# Patient Record
Sex: MALE | Race: OTHER | HISPANIC OR LATINO | URBAN - METROPOLITAN AREA
[De-identification: names, ages, dates, MRNs, and addresses within clinical notes are randomized per-mention and may not be internally consistent; named-entity substitution may affect disease eponyms.]

---

## 2020-12-14 ENCOUNTER — EMERGENCY (EMERGENCY)
Facility: HOSPITAL | Age: 50
LOS: 1 days | Discharge: HOME | End: 2020-12-14
Attending: EMERGENCY MEDICINE | Admitting: EMERGENCY MEDICINE
Payer: MEDICAID

## 2020-12-14 VITALS
WEIGHT: 203.05 LBS | OXYGEN SATURATION: 98 % | HEIGHT: 64 IN | TEMPERATURE: 98 F | DIASTOLIC BLOOD PRESSURE: 90 MMHG | SYSTOLIC BLOOD PRESSURE: 151 MMHG | HEART RATE: 92 BPM | RESPIRATION RATE: 15 BRPM

## 2020-12-14 DIAGNOSIS — Z20.828 CONTACT WITH AND (SUSPECTED) EXPOSURE TO OTHER VIRAL COMMUNICABLE DISEASES: ICD-10-CM

## 2020-12-14 PROCEDURE — U0003: CPT

## 2020-12-14 PROCEDURE — 99283 EMERGENCY DEPT VISIT LOW MDM: CPT

## 2020-12-14 NOTE — ED PROVIDER NOTE - NSFOLLOWUPINSTRUCTIONS_ED_ALL_ED_FT
COVID-19 (Enfermedad por coronavirus 2019)    LO QUE NECESITA SABER:    COVID-19 es la enfermedad causada por el nuevo coronavirus del 2019. Se encontró por primera vez en individuos en reyna parte de [place] a finales de 2019. El virus se está propagando a otros países a medida que las personas infectadas viajan. Los coronavirus generalmente causan infecciones respiratorias (nariz, garganta y pulmones), deanna un resfriado. También pueden causar infecciones graves, deanna el síndrome respiratorio del [place] (MERS) y el síndrome respiratorio blu severo (SARS). El nuevo virus está relacionado con el coronavirus del SARS, por lo que oficialmente se denomina coronavirus del SARS 2 (SARS-CoV-2).    INSTRUCCIONES SOBRE EL [facility]:    Si raza que usted o alguien que conoce puede estar infectado:Es importante que cualquier persona que pueda estar infectada se georgie la prueba de inmediato. Georgie lo siguiente para proteger a otras personas:     Si se requiere atención de emergencia,avise al operador de la posible infección, o llame antes y avise al servicio de urgencias.      Llame a un médicopara verlo en el consultorio. Cualquier persona que pueda estar infectada no debe llegar sin llamar london. El médico deberá proteger a los miembros del personal y a otros pacientes.      La persona que puede estar infectada debe usar reyna mascarilla médicaantes de recibir atención médica. La mascarilla debe permanecer puesta hasta que el médico le indique que se la quite.    Llame al número local de emergencias (911 en los [place]) o pídale a alguien que lo lleve al departamento de emergencias si:Tiene signos o síntomas de COVID-19, y cualquiera de los siguientes es cierto:     Viajó en los últimos 14 días a reyna nabil donde el virus está activo o tuvo un contacto cercano con alguien que lo hizo.      Usted tuvo contacto cercano en los últimos 14 días con alguien que tiene reyna infección confirmada.    Llame a escobedo médico si:No tiene signos o síntomas de COVID-19, yodit cualquiera de los siguientes es cierto:     Viajó en los últimos 14 días a reyna nabil donde el virus está activo o tuvo un contacto cercano con alguien que lo hizo.      Usted tuvo contacto cercano en los últimos 14 días con alguien que tiene reyna infección confirmada.      Usted tiene preguntas o inquietudes acerca de escobedo condición o cuidado.    Medicamentos:Es posible que necesite alguno de los siguientes si los síntomas son leves:     Los descongestionantesayudan a reducir la congestión nasal y ayudan a respirar más fácilmente. Si stanton pastillas descongestionantes, pueden causarle agitación o problemas para dormir. No use aerosol descongestionante por más de unos cuantos días.      Los jarabes para la tosayudan a reducir la tos. Pregúntele a escobedo médico cuál tipo de medicamento para la tos es mejor para usted.      Acetaminofénalivia el dolor y baja la fiebre. Está disponible sin receta médica. Pregunte la cantidad y la frecuencia con que debe tomarlos. Siga las indicaciones. Munira las etiquetas de todos los demás medicamentos que esté usando para saber si también contienen acetaminofén, o pregunte a escobedo médico o farmacéutico. El acetaminofén puede causar daño en el hígado cuando no se stanton de forma correcta. No use más de 4 gramos (4000 miligramos) en total de acetaminofeno en un día.      Los DYLLAN,deanna el ibuprofeno, ayudan a disminuir la inflamación, el dolor y la fiebre. Los DYLLAN pueden causar sangrado estomacal o problemas renales en ciertas personas. Si usted stanton un medicamento anticoagulante, siempre pregúntele a escobedo médico si los DYLLAN son seguros para usted. Siempre munira la etiqueta de jimbo medicamento y siga las instrucciones.      [place] justo medicamentos deanna se le haya indicado.Consulte con escobedo médico si usted raza que escobedo medicamento no le está ayudando o si presenta efectos secundarios. Infórmele si es alérgico a cualquier medicamento. Mantenga reyna lista actualizada de los medicamentos, las vitaminas y los productos herbales que stanton. Incluya los siguientes datos de los medicamentos: cantidad, frecuencia y motivo de administración. Traiga con usted la lista o los envases de las píldoras a justo citas de seguimiento. Lleve la lista de los medicamentos con usted en jayme de reyna emergencia.    Cómo se propaga el coronavirus 2019:El virus parece propagarse rápida y fácilmente. A continuación se indican las formas en que se raza que se propaga el virus, yodit es posible que surja más información:     Las gotitas son la forma más común de propagación de todos los coronavirus.El virus puede viajar en gotitas que se gino cuando reyna persona habla, tose o estornuda. Cualquiera que respire el virus o se lo meta en los ojos puede infectarse.      Reyna persona infectada puede ser capaz de dejar el virus en objetos y superficies.Otra persona puede contraer el virus en justo darlene al tocar el objeto o la superficie. La infección se produce si la persona se toca los ojos o la boca sin antes lavarse las darlene. Aún no se sabe cuánto tiempo puede permanecer el virus en un objeto o superficie. Según la evidencia, puede durar hasta 9 días a temperatura ambiente.      El contacto de persona a persona puede propagar el virus.Por ejemplo, reyna persona infectada puede propagar el virus al darle la mano a alguien. En jimbo momento, no parece que el virus pueda transmitirse a un bebé isabel el embarazo o el parto. El virus tampoco parece propagarse isabel la lactancia. Si está embarazada o amamantando, hable con escobedo médico u obstetra sobre cualquier preocupación que tenga.      Un animal infectado puede ser capaz de infectar a reyna persona que lo toque.[place] puede ocurrir en mercados vivos o en reyna randi.    Prevenga la infección por coronavirus 2019:Todas las personas deberían hacer lo siguiente para evitar contraer o propagar el virus:     Lávese las darlene frecuentemente.Use agua y jabón cada vez que se lave las darlene. Frótese las darlene enjabonadas, entrelazando los dedos. Use los dedos de reyna mano para restregar debajo de las uñas de la otra mano. Lávese isabel al menos 20 segundos. Enjuague con agua corriente caliente isabel varios segundos. Luego séquese las darlene. Use gel antibacterial si no tiene agua y jabón a escobedo disponibilidad. No se toque los ojos o la boca sin antes lavarse las darlene. Lavado de darlene           Cúbrase al toser o estornudar.Use un pañuelo que cubra la boca y la nariz. Arroje el pañuelo a la basura de inmediato. Use el ángulo del brazo si no tiene un pañuelo disponible. Lávese las darlene con agua y jabón o use un desinfectante de darlene. No se pare cerca de nadie que esté estornudando o tosiendo.      Tenga cuidado al estar con otras personas.La mejor manera de prevenir la infección es evitar a cualquiera que esté infectado, yodit esto puede ser difícil. Reyna persona infectada puede ser capaz de propagar el virus antes de que aparezcan los signos o síntomas. Hasta que no se sepa más, kendra vez deba evitar darse la mano con otras personas. Si se da la mano, lávese las darlene o use un desinfectante de darlene lo antes posible. No es necesario que use reyna mascarilla médica si se encuentra arianna y no está cuidando a reyna persona infectada.      Pregunte sobre las vacunas que pudiera necesitar.No hay ninguna vacuna disponible para el nuevo coronavirus. Yodit cualquier infección puede afectar escobedo sistema inmunitario. Un sistema inmunitario debilitado lo hace más vulnerable al nuevo coronavirus. Hasta que se desarrolle reyna vacuna contra el nuevo virus, georgie lo siguiente:   Vacúnese contra la gripe tan pronto deanna se recomiende cada año.La vacuna antigripal se ofrece a partir de septiembre u octubre. Los virus de la gripe cambian, por lo que es importante vacunarse contra la gripe cada año.      Hable con escobedo médico sobre escobedo historial de vacunación.Dígale si no recibió ciertas vacunas cuando era dona, o si no recibió todas las dosis recomendadas. Dígale si no conoce justo antecedentes de vacunas. Escobedo médico le indicará qué vacunas necesita y cuándo recibirlas.           Si tiene COVID-19:Los médicos le darán instrucciones específicas que debe seguir. Las siguientes son pautas generales para recordarle cómo mantener a los demás a leonides hasta que usted esté arianna:     Limite el contacto cercano con otras personas.Escobedo médico le dirá cuándo podrá volver a estar con otras personas. [place] puede ser cuando no tenga fiebre, no tome medicamentos para la fiebre y no tenga síntomas. Los líquidos de justo vías respiratorias deberán mostrar un resultado negativo para el virus 2 veces con un intervalo de al menos 24 horas. Hasta entonces, georgie lo siguiente junto con las instrucciones de escobedo médico:   Salga de escobedo casa solo para las citas médicas. Siempre llame london al consultorio del médico para que se prepare para mantener a los demás a leonides.      Manténgase al menos a 6 pies (2 metros) de los demás.      Duerma en reyna habitación separada de las demás de la casa.      No se dé la mano con otras personas.      Use reyna mascarilla médica cuando haya otras personas cerca de usted.[place] puede ayudar a evitar que las gotitas propaguen el virus cuando usted habla, estornuda o tose.      No comparta artículos.No comparta platos, toallas ni otros artículos con nadie. Los artículos deben ser lavados después de usarlos.      No manipule animales vivos.El virus puede propagarse a los animales, incluyendo a las mascotas. Hasta que se sepa más, es mejor no tocar, jugar o manipular animales vivos.    Cuidados personales:    [place] más líquidos deanna se le indique.Los líquidos le ayudarán a aflojar y disolver la mucosidad para que la pueda expectorar. Los líquidos ayudarán a evitar la deshidratación. Los líquidos que ayudan a prevenir la deshidratación pueden ser agua, jugo de fruta y caldo. No tome líquidos que contienen cafeína. La cafeína puede aumentar el riesgo de deshidratación. Pregúntele a escobedo médico cuál es la cantidad de líquido que necesita ingerir a diario.      Alivie el dolor de garganta.Georgie gárgaras de agua tibia con sal. [place] podría ayudar a aliviar el dolor de garganta. Prepare agua salina disolviendo ¼ de cucharada de sal a 1 taza de agua tibia. Usted puede también chupar dulces duros o pastillas para la garganta. Usted puede usar aerosol para el dolor de garganta.      Use un humidificador o vaporizador.Use un humidificador de wally frío o un vaporizador para elevar la humedad en escobedo casa. [place] podría ayudarle a respirar más fácilmente y al mismo tiempo disminuir la tos.      Use gotas nasales de solución salina deanna se le haya indicado.Estas ayudan a aliviar la congestión.      Aplique vaselina en la parte externa alrededor de las fosas nasales.Esta puede disminuir la irritación de sonarse la nariz.      No fume.La nicotina y otros químicos en los cigarrillos y cigarros pueden empeorar justo síntomas. También pueden causar infecciones deanna la bronquitis o la neumonía. Pida información a escobedo médico si usted actualmente fuma y necesita ayuda para dejar de fumar. Los cigarrillos electrónicos o el tabaco sin humo igualmente contienen nicotina. Consulte con escobedo médico antes de utilizar estos productos.    Si cuida de alguien que tiene COVID-19:    Use reyna mascarilla médica cuando esté cerca de esta persona.[place] puede ayudar a protegerlo de las gotitas que transportan el virus cuando la persona habla, estornuda o tose.      No permita que otros se acerquen a la persona.Nadie debe ir a la casa de la persona a menos que sea necesario. Mantenga la natalia de la habitación cerrada a menos que necesite entrar o salir.      Asegúrese de que la habitación de la persona tenga un buen flujo de aire.Puede abrir la ventana si el clima lo permite. También se puede encender el aire acondicionado para ayudar a que el aire se mueva.      Limpie los artículos que la persona usa o toca.Use guantes desechables para chance la ropa de la persona. Coloque la ropa sucia en reyna bolsa de plástico. Use [place] y jabón para chance la ropa de la persona. Lave los utensilios para comer y otros artículos después de que la persona los use. Deseche los guantes después de usarlos.      Limpie las superficies con frecuencia.Use lejía diluida con agua o toallitas desinfectantes. Limpie los mostradores, los pomos de las marina, los asientos de los inodoros y otras superficies.    Acuda a la consulta de control con escobedo médico según las indicaciones:Anote justo preguntas para que se acuerde de hacerlas isabel justo visitas.    Para más información:    Centers for Disease Control and Prevention  [address] [place], [place] [id number]  Phone: [phone]  Phone: [phone]  Web Address: http://www.cdc.gov

## 2020-12-14 NOTE — ED PROVIDER NOTE - OBJECTIVE STATEMENT
pt 51 yo m here for asymptomatic covid tested. no +contact with COVID19  denies rash, abd pain, diarrhea, n/v, headache, vision changes, SOB, CP, difficulty tolerating PO, cough, fever, chills, sore throat, diffuse body aches  no recent travel

## 2020-12-14 NOTE — ED ADULT NURSE NOTE - OBJECTIVE STATEMENT
Pt is alert, came to the ER due to need for Covid swab for travel purposes. No symptoms or Covid exposure.

## 2020-12-14 NOTE — ED PROVIDER NOTE - ATTENDING CONTRIBUTION TO CARE
Bozenaal with [facility]. Here for COVID swab in prep for travel. Asymptomatic. I performed a face to face bedside interview with patient regarding history of present illness, review of symptoms and past medical history. I completed an independent physical exam.  I have discussed the patient's plan of care with Physician Assistant (PA). I agree with note as stated above, having amended the EMR as needed to reflect my findings.   This includes History of Present Illness, HIV, Past Medical/Surgical/Family/Social History, Allergies and Home Medications, Review of Systems, Physical Exam, and any Progress Notes during the time I functioned as the attending physician for this patient.

## 2020-12-14 NOTE — ED PROVIDER NOTE - PATIENT PORTAL LINK FT
You can access the FollowMyHealth Patient Portal offered by [facility] by registering at the following website: [email or web address]. By joining [employer]’s FollowMyHealth portal, you will also be able to view your health information using other applications (apps) compatible with our system.

## 2020-12-15 LAB — SARS-COV-2 RNA SPEC QL NAA+PROBE: SIGNIFICANT CHANGE UP

## 2021-01-27 ENCOUNTER — EMERGENCY (EMERGENCY)
Facility: HOSPITAL | Age: 51
LOS: 1 days | Discharge: HOME | End: 2021-01-27
Attending: EMERGENCY MEDICINE | Admitting: EMERGENCY MEDICINE
Payer: MEDICAID

## 2021-01-27 VITALS
SYSTOLIC BLOOD PRESSURE: 163 MMHG | RESPIRATION RATE: 16 BRPM | HEIGHT: 64 IN | OXYGEN SATURATION: 98 % | DIASTOLIC BLOOD PRESSURE: 100 MMHG | TEMPERATURE: 98 F | HEART RATE: 102 BPM | WEIGHT: 205.03 LBS

## 2021-01-27 PROBLEM — Z78.9 OTHER SPECIFIED HEALTH STATUS: Chronic | Status: ACTIVE | Noted: 2020-12-14

## 2021-01-27 LAB — SARS-COV-2 RNA SPEC QL NAA+PROBE: SIGNIFICANT CHANGE UP

## 2021-01-27 PROCEDURE — 99283 EMERGENCY DEPT VISIT LOW MDM: CPT

## 2021-01-27 PROCEDURE — U0003: CPT

## 2021-01-27 PROCEDURE — U0005: CPT

## 2021-01-27 NOTE — ED PROVIDER NOTE - NSFOLLOWUPINSTRUCTIONS_ED_ALL_ED_FT
1. You were seen in the ED and underwent testing for the novel coronavirus (COVID-19). The results are not back yet and you will be contacted with the result in 5-7 days, but may take longer. You were also tested for other common viruses such as the flu and cold viruses. You will be notified if you test positive for any of these.    2. Until your test results are back, YOU MUST SELF-QUARANTINE until you are told to other otherwise by [facility] or the local [facility] department. This is extremely important to limit the spread of this virus. Please refer closely to the packet provided to you on the specifics of the process of self-quarantine.    3. If you end up testing positive for the virus, you will instructed as to when you can return to your usual activities. If you do not hear from anyone in 7 days, call [phone].     4. Return to the ED for difficulty breathing.    5. You may take over the counter acetaminophen (Tylenol) 650mg every 6 hours as needed for fever or pain. There is some concern in the medical community about using ibuprofen (Advil, Motrin) and other NSAIDs in people with COVID infections and until there is more research on this subject it may be best to avoid NSAIDs like ibuprofen, unless you have an allergy to acetaminophen (Tylenol).  Do NOT exceed 3500mg acetaminophen in 24 hours.  Please do not take these medications if you do not have pain or fever or if you have any history of liver disease.     -------------    What is a coronavirus?  Coronaviruses are a large family of viruses that cause illnesses ranging from the common cold to more severe diseases such as Middle East Respiratory Syndrome (MERS) and Severe Acute Respiratory Syndrome (SARS).    What is Novel Coronavirus (COVID-19)?  The Centers for Disease Control and Prevention (CDC) is closely monitoring the outbreak caused by COVID-19. For the latest information about COVID-19, visit the CDC website at CDC.gov/Coronavirus    How are coronaviruses spread?  Coronaviruses can be transmitted from person-toperson, usually after close contact with an infected  person (for example, in a household, workplace, or healthcare setting), via droplets that become airborne after a cough or sneeze. These droplets can then infect a nearby person. Transmission can also occur by touching recently contaminated surfaces.    Is there a treatment for a COVID-19?  There is no specific treatment for disease caused by COVID-19. However, many of the symptoms can be treated based on the patient’s clinical condition. Supportive care for infected persons can be highly effective.    What are the symptoms of coronavirus infection?  It depends on the virus, but common signs include fever and/or respiratory symptoms such as cough and shortness of breath. In more severe cases, infection can cause pneumonia, severe acute respiratory syndrome, kidney failure and even death. Fortunately, most cases of COVID-19 have an illness no different than the influenza (flu), with a majority of these patients having mild symptoms and overall mortality which appears to be not much different than the flu.    What can I do to protect myself?  The best precautionary measures:  – washing your hands  – covering your cough  – disinfecting surfaces  – it is also advisable to avoid close contact with anyone showing symptoms of respiratory illness such as coughing and sneezing  – those with symptoms should wear a surgical mask when around others    What can I do to protect those around me?  If you have been identified as someone who may be infected with COVID-19, we recommend you follow the self-isolation procedures outlined on the following page to protect those around you and to limit the spread of this virus.    We recommend the below precautionary steps from now until 14 days from when you returned from your travel or date of your last known possible contact:    — Do not go to work, school or public areas. Avoid using public transportation, ridesharing or taxis.  — As much as possible, separate yourself from other people in your home. If you can, you should stay in a room and away from other people. Also, you should use a separate bathroom if available.  — Wear the supplied mask whenever you are around other people.  — If you have a non-urgent medical appointment, please reschedule for a later date. If the appointment is urgent, please call the health care provider and tell them that you are on self-isolation for possible COVID-19. This will help the health care provider’s office take steps to keep other people from getting infected or exposed. If you can reschedule routine appointments, do so.  — Wash your hands often with soap and water for at least 15 to 20 seconds or clean your hands with an alcohol-based hand  that contains 60 to 95% alcohol, covering all surfaces of your hands and rubbing them together until they feel dry. Soap and water should be used preferentially if hands are visibly dirty.  — Cover your mouth and nose with a tissue when you cough or sneeze. Throw used tissues in a lined trash can. Immediately wash your hands.  — Avoid touching your eyes, nose, and mouth with your hands.  — Avoid sharing personal household items. You should not share dishes, drinking glasses, cups, eating utensils, towels, or bedding with other people or pets in your home. After using these items, they should be washed thoroughly with soap and water.  — Clean and disinfect all “high-touch” surfaces every day. High touch surfaces include counters, tabletops, doorknobs, light switches, remote controls, bathroom fixtures, toilets, phones, keyboards, tablets, and bedside tables. Also, clean any surfaces that may have blood, stool, or body fluids on them.    ------------------------------------------  Information for patients who have received a COVID-19 test.    The COVID-19 (novel coronavirus) test  Results may take up to 5-7 days to become available.      If your result is positive, you will receive a phone call from one of our coronavirus specialists. While we will do our best to also call patients with a negative test result, the sheer volume of tests being performed may make this difficult to do in a timely fashion. If you haven’t heard from us within 5 days and you’d like to check on your results, you can check our [facility] or call one of our coronavirus specialists at [address] [place] [address] (available 24/7)    Please DO NOT call the site where you received the test to obtain your results.    If the test is positive -   You will continue home isolation until you are completely well, you have no fever, and it has been at least 14 days since your positive test. The health department in your city or county may also contact you with additional instructions.    If your test is negative -    You will be able to stop home isolation and resume standard precautions, similiar to how you would manage the common cold or flu.  If you have any questions, you can reach out to one of our coronavirus specialists  at [address] [place] [address].    REMEMBER - a negative COVID test means you were negative AT THE TIME OF TESTING, and it is possible to have contracted COVID after being tested.        CORONAVIRUS DISCHARGE INSTRUCTIONS  COVID-19 (Coronavirus Disease 2019)    WHAT YOU NEED TO KNOW:    COVID-19 is the disease caused by the 2019 novel (new) coronavirus. It was first found in individuals in a part of [place] in late 2019. The virus is spreading to other countries as infected persons travel. Coronaviruses generally cause respiratory (nose, throat, and lung) infections, such as a cold. They can also cause serious infections such as Middle East respiratory syndrome (MERS) and severe acute respiratory syndrome (SARS). The new virus is related to the SARS coronavirus, so it is officially named SARS coronavirus 2 (SARS-CoV-2).    DISCHARGE INSTRUCTIONS:    If you think you or someone you know may be infected: It is important for anyone who may be infected to get tested right away. Do the following to protect others:     If emergency care is needed, tell the  about the possible infection, or call ahead and tell the emergency department.      Call a healthcare provider to be seen in the office. Anyone who may be infected should not arrive without calling first. The provider will need to protect staff members and other patients.      The person who may be infected needs to wear a medical mask before getting medical care. The mask needs to stay on until the provider says to remove it.    Call your local emergency number (911 in the ) or go to the emergency department if: You have signs or symptoms of COVID-19, and any of the following is true:     You traveled within the last 14 days to an area where the virus is active or had close contact with someone who did.      You had close contact within the last 14 days with someone who has a confirmed infection.    Call your doctor if: You do not have signs or symptoms of COVID-19, but any of the following is true:     You traveled within the last 14 days to an area where the virus is active or had close contact with someone who did.      You had close contact within the last 14 days with someone who has a confirmed infection.      You have questions or concerns about your condition or care.    Medicines: You may need any of the following for mild symptoms:     Decongestants help reduce nasal congestion and help you breathe more easily. If you take decongestant pills, they may make you feel restless or cause problems with your sleep. Do not use decongestant sprays for more than a few days.      Cough suppressants help reduce coughing. Ask your healthcare provider which type of cough medicine is best for you.      Acetaminophen decreases pain and fever. It is available without a doctor's order. Ask how much to take and how often to take it. Follow directions. Read the labels of all other medicines you are using to see if they also contain acetaminophen, or ask your doctor or pharmacist. Acetaminophen can cause liver damage if not taken correctly. Do not use more than 4 grams (4,000 milligrams) total of acetaminophen in one day.     Take your medicine as directed. Contact your healthcare provider if you think your medicine is not helping or if you have side effects. Tell him or her if you are allergic to any medicine. Keep a list of the medicines, vitamins, and herbs you take. Include the amounts, and when and why you take them. Bring the list or the pill bottles to follow-up visits. Carry your medicine list with you in case of an emergency.    How the 2019 coronavirus spreads: The virus appears to spread quickly and easily. The following are ways the virus is thought to spread, but more information may be coming:     Droplets are the most common way all coronaviruses spread. The virus can travel in droplets that form when a person talks, coughs, or sneezes. Anyone who breathes in the virus or gets it in his or her eyes can become infected.      An infected person may be able to leave the virus on objects and surfaces. Another person can get the virus on his or her hands by touching the object or surface. Infection happens if the person then touches his or her eyes or mouth with unwashed hands. It is not yet known how long the virus can stay on an object or surface. Evidence shows it may be as long as 9 days at room temperature.      Person-to-person contact may spread the virus. For example, an infected person can spread the virus by shaking hands with someone. At this time, it does not appear that the virus can be passed to a baby during pregnancy or delivery. The virus also does not appear to spread during breastfeeding. If you are pregnant or breastfeeding, talk to your healthcare provider or obstetrician about any concerns you have.      An infected animal may be able to infect a person who touches it. This may happen at live markets or on a farm.    Prevent a 2019 coronavirus infection: Everyone should do the following to prevent getting or spreading the virus:     Wash your hands often. Use soap and water every time you wash your hands. Rub your soapy hands together, lacing your fingers. Use the fingers of one hand to scrub under the nails of the other hand. Wash for at least 20 seconds. Rinse with warm, running water for several seconds. Then dry your hands. Use germ-killing gel if soap and water are not available. Do not touch your eyes or mouth without washing your hands first. Handwashing           Cover a sneeze or cough. Use a tissue that covers your mouth and nose. Throw the tissue away in a trash can right away. Use the bend of your arm if a tissue is not available. Wash your hands well with soap and water or use a hand . Do not stand close to anyone who is sneezing or coughing.      Be careful around others. The best way to prevent infection is to avoid anyone who is infected, but this may be difficult. An infected person may be able to spread the virus before signs or symptoms begin. Until more is known, you may not want to shake hands with others. If you do shake hands, wash your hands or use hand  as soon as possible. You do not need to wear a medical mask if you are well and not caring for an infected person.      Ask about vaccines you may need. No vaccine is available for the new coronavirus. But any infection can affect your immune system. A weakened immune system makes you more vulnerable to the new coronavirus. Until a vaccine against the new virus is developed, do the following:   Get a flu vaccine as soon as recommended each year. The flu vaccine is available starting in September or October. Flu viruses change, so it is important to get a flu vaccine every year.      Talk to your healthcare provider about your vaccine history. Tell him or her if you did not get certain vaccines as a child, or you did not get all recommended doses. Tell him or her if you do not know your vaccine history. He or she will tell you which vaccines you need, and when to get them.           If you have COVID-19: Healthcare providers will give you specific instructions to follow. The following are general guidelines to remind you of how to keep others safe until you are well:     Limit close contact with others. Your healthcare provider will tell you when it is okay to be around others. This may be when you do not have a fever, do not take fever medicine, and have no symptoms. Fluid from your respiratory tract will need to test negative for the virus 2 times at least 24 hours apart. Until then, do the following along with any instructions from your provider:   Only go out of the house for medical appointments. Always call the provider’s office first so he or she can prepare to keep others safe.      Stay at least 6 feet (2 meters) away from others.      Sleep in a different room from others in the house.      Do not shake hands with other people.      Wear a medical mask when others are near you. This can help prevent droplets from spreading the virus when you talk, sneeze, or cough.      Do not share items. Do not share dishes, towels, or other items with anyone. Items need to be washed after you use them.      Do not handle live animals. The virus may be spread to animals, including pets. Until more is known, it is best not to touch, play with, or handle live animals.    Self-care:     Drink more liquids as directed. Liquids will help thin and loosen mucus so you can cough it up. Liquids will also help prevent dehydration. Liquids that help prevent dehydration include water, fruit juice, and broth. Do not drink liquids that contain caffeine. Caffeine can increase your risk for dehydration. Ask your healthcare provider how much liquid to drink each day.      Soothe a sore throat. Gargle with warm salt water. This may help your sore throat feel better. Make salt water by dissolving ¼ teaspoon salt in 1 cup warm water. You may also suck on hard candy or throat lozenges. You may use a sore throat spray.      Use a humidifier or vaporizer. Use a cool mist humidifier or a vaporizer to increase air moisture in your home. This may make it easier for you to breathe and help decrease your cough.      Use saline nasal drops as directed. These help relieve congestion.      Apply petroleum-based jelly around the outside of your nostrils. This can decrease irritation from blowing your nose.      Do not smoke. Nicotine and other chemicals in cigarettes and cigars can make your symptoms worse. They can also cause infections such as bronchitis or pneumonia. Ask your healthcare provider for information if you currently smoke and need help to quit. E-cigarettes or smokeless tobacco still contain nicotine. Talk to your healthcare provider before you use these products.    If you take care of someone who has COVID-19:     Wear a medical mask when you are near the person. This can help protect you from droplets that carry the virus when the person talks, sneezes, or coughs.      Do not allow others to go near the person. No one should come to the person's home unless it is necessary. Keep the room's door shut unless you need to go in or out.      Make sure the person's room has good air flow. You may be able to open the window if the weather allows. An air conditioner can also be turned on to help air move.      Clean items the person uses or touches. Wear disposable gloves to handle the person's laundry. Place the laundry in a plastic bag. Use hot water and soap to wash the person's laundry. Wash eating utensils and other items after the person uses them. Throw your gloves away after you use them.      Clean surfaces often. Use bleach diluted with water or disinfecting wipes. Clean counters, doorknobs, toilet seats, and other surfaces.    Follow up with your doctor as directed: Write down your questions so you remember to ask them during your visits.    For more information:     Centers for Disease Control and Prevention  [address] [place], [place]  Phone: 5-752-2491415  Phone: 0-207-3404433  Web Address: http://www.cdc.gov

## 2021-01-27 NOTE — ED PROVIDER NOTE - PATIENT PORTAL LINK FT
You can access the FollowMyHealth Patient Portal offered by [facility] by registering at the following website: [email or web address]. By joining [facility]’s FollowMyHealth portal, you will also be able to view your health information using other applications (apps) compatible with our system.

## 2021-01-27 NOTE — ED ADULT NURSE NOTE - OBJECTIVE STATEMENT
49 y/o M presents to ED for COVID swab. Pt. A&Ox4. Pt. asymptomatic and well-appearing. Pt. denies SOB, headache, fever/chills, abdominal pain, n/v/d, chest pain, weakness/fatigue, loss of smell/taste. VSS. COVID swab collected and sent to lab. Safety and comfort provided.

## 2021-01-27 NOTE — ED PROVIDER NOTE - OBJECTIVE STATEMENT
50M no PMHx here for covid swab to travel to [facility]. Pt has had no contacts and no sx.

## 2021-06-10 ENCOUNTER — EMERGENCY (EMERGENCY)
Facility: HOSPITAL | Age: 51
LOS: 1 days | Discharge: HOME | End: 2021-06-10
Attending: EMERGENCY MEDICINE | Admitting: EMERGENCY MEDICINE
Payer: MEDICAID

## 2021-06-10 VITALS
RESPIRATION RATE: 16 BRPM | DIASTOLIC BLOOD PRESSURE: 80 MMHG | HEIGHT: 64 IN | HEART RATE: 82 BPM | OXYGEN SATURATION: 97 % | SYSTOLIC BLOOD PRESSURE: 146 MMHG | WEIGHT: 199.96 LBS | TEMPERATURE: 99 F

## 2021-06-10 PROCEDURE — 99283 EMERGENCY DEPT VISIT LOW MDM: CPT

## 2021-06-10 PROCEDURE — U0003: CPT

## 2021-06-10 PROCEDURE — U0005: CPT

## 2021-06-10 NOTE — ED PROVIDER NOTE - ATTENDING CONTRIBUTION TO CARE
Stone with BRIGIDA Martin. pt 50 yo m here for asymptomatic covid tested. needs covid for travel   denies rash, abd pain, diarrhea, n/v, headache, vision changes, SOB, CP, difficulty tolerating PO, cough, fever, chills, sore throat, diffuse body aches  no recent travel  I performed a face to face bedside interview with patient regarding history of present illness, review of symptoms and past medical history. I completed an independent physical exam.  I have discussed the patient's plan of care with Physician Assistant (PA). I agree with note as stated above, having amended the EMR as needed to reflect my findings.   This includes History of Present Illness, HIV, Past Medical/Surgical/Family/Social History, Allergies and Home Medications, Review of Systems, Physical Exam, and any Progress Notes during the time I functioned as the attending physician for this patient.

## 2021-06-10 NOTE — ED PROVIDER NOTE - CLINICAL SUMMARY MEDICAL DECISION MAKING FREE TEXT BOX
pt 50 yo m here for asymptomatic covid tested. needs covid for travel   denies rash, abd pain, diarrhea, n/v, headache, vision changes, SOB, CP, difficulty tolerating PO, cough, fever, chills, sore throat, diffuse body aches  no recent travel

## 2021-06-10 NOTE — ED PROVIDER NOTE - PATIENT PORTAL LINK FT
You can access the FollowMyHealth Patient Portal offered by [facility] by registering at the following website: [email or web address]. By joining [employer]’s FollowMyHealth portal, you will also be able to view your health information using other applications (apps) compatible with our system.

## 2021-06-10 NOTE — ED PROVIDER NOTE - OBJECTIVE STATEMENT
pt 52 yo m here for asymptomatic covid tested. needs covid for travel   denies rash, abd pain, diarrhea, n/v, headache, vision changes, SOB, CP, difficulty tolerating PO, cough, fever, chills, sore throat, diffuse body aches  no recent travel

## 2021-06-10 NOTE — ED PROVIDER NOTE - IV ALTEPLASE DOOR HIDDEN
08/20/21        Hanna Rivas [address] [id number]      Dear Tom Prescott records indicate that you have outstanding lab work and or testing that was ordered for you and has not yet been completed:  Orders Placed This Encounter
08/26/21        Hanna Rivas [address] [id number]      Dear Angela Nye records indicate that you have outstanding lab work and or testing that was ordered for you and has not yet been completed:  Orders Placed This Encounter
show

## 2021-06-11 LAB — SARS-COV-2 RNA SPEC QL NAA+PROBE: SIGNIFICANT CHANGE UP

## 2024-02-07 NOTE — ED ADULT NURSE NOTE - NS ED NURSE DC PT EDUCATION RESOURCES
[Normal] : the sclera and conjunctiva were normal [Hearing Threshold Finger Rub Not [place]] : hearing was normal [Normal Appearance] : the appearance of the neck was normal [No Respiratory Distress] : no respiratory distress [Abdomen Tenderness] : non-tender [No Masses] : no abdominal mass palpated [Abdomen Soft] : soft [Oriented To Time, Place, And Person] : oriented to person, place, and time Yes

## 2024-11-05 NOTE — ED ADULT NURSE NOTE - CADM POA URETHRAL CATHETER
Nursing Transfer Note      Reason patient is being transferred: PACU 19    Transfer To: [facility] 509    Transfer via bed    Transfer with IV Pole    Transported by Patient Transport    Medicines sent: Mupirocin Ointment    Any special needs or follow-up needed: Routine Care    Chart send with patient: Yes    Notified: spouse, daughter    Patient reassessed at: 11/5/2024 1306 (date, time)   
No
